# Patient Record
Sex: MALE | Race: BLACK OR AFRICAN AMERICAN | Employment: OTHER | URBAN - METROPOLITAN AREA
[De-identification: names, ages, dates, MRNs, and addresses within clinical notes are randomized per-mention and may not be internally consistent; named-entity substitution may affect disease eponyms.]

---

## 2017-05-13 ENCOUNTER — APPOINTMENT (OUTPATIENT)
Dept: GENERAL RADIOLOGY | Age: 22
End: 2017-05-13
Payer: MEDICAID

## 2017-05-13 ENCOUNTER — HOSPITAL ENCOUNTER (EMERGENCY)
Age: 22
Discharge: HOME | End: 2017-05-13
Attending: STUDENT IN AN ORGANIZED HEALTH CARE EDUCATION/TRAINING PROGRAM
Payer: MEDICAID

## 2017-05-13 VITALS
RESPIRATION RATE: 18 BRPM | SYSTOLIC BLOOD PRESSURE: 109 MMHG | OXYGEN SATURATION: 97 % | HEART RATE: 76 BPM | DIASTOLIC BLOOD PRESSURE: 67 MMHG | TEMPERATURE: 98.8 F

## 2017-05-13 DIAGNOSIS — Z46.59 ENCOUNTER FOR CARE RELATED TO FEEDING TUBE: Primary | ICD-10-CM

## 2017-05-13 PROCEDURE — 6360000004 HC RX CONTRAST MEDICATION: Performed by: STUDENT IN AN ORGANIZED HEALTH CARE EDUCATION/TRAINING PROGRAM

## 2017-05-13 PROCEDURE — 74000 XR ABDOMEN LIMITED (KUB): CPT

## 2017-05-13 PROCEDURE — 99282 EMERGENCY DEPT VISIT SF MDM: CPT

## 2017-05-13 RX ORDER — DIAZEPAM 5 MG/1
2.5 TABLET ORAL EVERY 8 HOURS
COMMUNITY

## 2017-05-13 RX ORDER — AMANTADINE HYDROCHLORIDE 50 MG/5ML
100 SOLUTION ORAL EVERY 12 HOURS
COMMUNITY

## 2017-05-13 RX ORDER — LORATADINE 10 MG/1
10 TABLET ORAL DAILY
COMMUNITY

## 2017-05-13 RX ORDER — LEVETIRACETAM 100 MG/ML
1500 SOLUTION ORAL 2 TIMES DAILY
COMMUNITY

## 2017-05-13 RX ORDER — PREDNISONE 2.5 MG
7.5 TABLET ORAL DAILY
COMMUNITY

## 2017-05-13 RX ORDER — POLYETHYLENE GLYCOL 3350 17 G/17G
17 POWDER, FOR SOLUTION ORAL 2 TIMES DAILY
COMMUNITY

## 2017-05-13 RX ORDER — MONTELUKAST SODIUM 10 MG/1
10 TABLET ORAL NIGHTLY
COMMUNITY

## 2017-05-13 RX ORDER — BACLOFEN 20 MG/1
30 TABLET ORAL 2 TIMES DAILY
COMMUNITY

## 2017-05-13 RX ORDER — IPRATROPIUM BROMIDE AND ALBUTEROL SULFATE 2.5; .5 MG/3ML; MG/3ML
1 SOLUTION RESPIRATORY (INHALATION) EVERY 6 HOURS
COMMUNITY

## 2017-05-13 RX ORDER — TRAMADOL HYDROCHLORIDE 50 MG/1
50 TABLET ORAL EVERY 8 HOURS
COMMUNITY

## 2017-05-13 RX ORDER — ASPIRIN 81 MG/1
81 TABLET, CHEWABLE ORAL DAILY
COMMUNITY

## 2017-05-13 RX ADMIN — DIATRIZOATE MEGLUMINE AND DIATRIZOATE SODIUM 90 ML: 660; 100 LIQUID ORAL; RECTAL at 14:20

## 2017-05-13 ASSESSMENT — ENCOUNTER SYMPTOMS: VOMITING: 0

## 2017-09-29 ENCOUNTER — APPOINTMENT (OUTPATIENT)
Dept: GENERAL RADIOLOGY | Age: 22
DRG: 720 | End: 2017-09-29
Payer: MEDICAID

## 2017-09-29 ENCOUNTER — HOSPITAL ENCOUNTER (INPATIENT)
Age: 22
LOS: 3 days | Discharge: NURSING, REHAB OR LONG-TERM CARE | DRG: 720 | End: 2017-10-02
Attending: EMERGENCY MEDICINE | Admitting: HOSPITALIST
Payer: MEDICAID

## 2017-09-29 DIAGNOSIS — J96.01 ACUTE RESPIRATORY FAILURE WITH HYPOXIA (HCC): ICD-10-CM

## 2017-09-29 DIAGNOSIS — J04.11 ACUTE TRACHEITIS WITH AIRWAY OBSTRUCTION: ICD-10-CM

## 2017-09-29 DIAGNOSIS — J45.52 SEVERE PERSISTENT ASTHMA WITH STATUS ASTHMATICUS: Primary | ICD-10-CM

## 2017-09-29 PROBLEM — A41.9 SEPSIS (HCC): Status: ACTIVE | Noted: 2017-09-29

## 2017-09-29 LAB
ALBUMIN SERPL-MCNC: 4.4 G/DL (ref 3.9–4.9)
ALP BLD-CCNC: 92 U/L (ref 35–104)
ALT SERPL-CCNC: 73 U/L (ref 0–41)
ANION GAP SERPL CALCULATED.3IONS-SCNC: 16 MEQ/L (ref 7–13)
AST SERPL-CCNC: 26 U/L (ref 0–40)
BASE EXCESS ARTERIAL: 4 (ref -3–3)
BASE EXCESS ARTERIAL: 4 (ref -3–3)
BASOPHILS ABSOLUTE: 0 K/UL (ref 0–0.2)
BASOPHILS RELATIVE PERCENT: 0.1 %
BILIRUB SERPL-MCNC: 0.7 MG/DL (ref 0–1.2)
BUN BLDV-MCNC: 14 MG/DL (ref 6–20)
CALCIUM SERPL-MCNC: 9.7 MG/DL (ref 8.6–10.2)
CHLORIDE BLD-SCNC: 104 MEQ/L (ref 98–107)
CO2: 28 MEQ/L (ref 22–29)
CREAT SERPL-MCNC: 0.69 MG/DL (ref 0.7–1.2)
EOSINOPHILS ABSOLUTE: 0 K/UL (ref 0–0.7)
EOSINOPHILS RELATIVE PERCENT: 0 %
GFR AFRICAN AMERICAN: >60
GFR NON-AFRICAN AMERICAN: >60
GLOBULIN: 3.4 G/DL (ref 2.3–3.5)
GLUCOSE BLD-MCNC: 112 MG/DL (ref 74–109)
HCO3 ARTERIAL: 29.4 MMOL/L (ref 21–29)
HCO3 ARTERIAL: 30.4 MMOL/L (ref 21–29)
HCT VFR BLD CALC: 55.9 % (ref 42–52)
HEMOGLOBIN: 17.6 G/DL (ref 14–18)
LACTATE: 1.52 MMOL/L (ref 0.4–2)
LACTATE: 2.65 MMOL/L (ref 0.4–2)
LACTIC ACID: 2.6 MMOL/L (ref 0.5–2.2)
LYMPHOCYTES ABSOLUTE: 0.5 K/UL (ref 1–4.8)
LYMPHOCYTES RELATIVE PERCENT: 3.1 %
MAGNESIUM: 2.3 MG/DL (ref 1.7–2.3)
MCH RBC QN AUTO: 27.3 PG (ref 27–31.3)
MCHC RBC AUTO-ENTMCNC: 31.4 % (ref 33–37)
MCV RBC AUTO: 87 FL (ref 80–100)
MONOCYTES ABSOLUTE: 0.5 K/UL (ref 0.2–0.8)
MONOCYTES RELATIVE PERCENT: 3.1 %
NEUTROPHILS ABSOLUTE: 15.2 K/UL (ref 1.4–6.5)
NEUTROPHILS RELATIVE PERCENT: 93.7 %
O2 SAT, ARTERIAL: 100 % (ref 93–100)
O2 SAT, ARTERIAL: 89 % (ref 93–100)
PCO2 ARTERIAL: 56 MM HG (ref 35–45)
PCO2 ARTERIAL: 62 MM HG (ref 35–45)
PDW BLD-RTO: 12.9 % (ref 11.5–14.5)
PERFORMED ON: ABNORMAL
PERFORMED ON: ABNORMAL
PH ARTERIAL: 7.3 (ref 7.35–7.45)
PH ARTERIAL: 7.33 (ref 7.35–7.45)
PLATELET # BLD: 227 K/UL (ref 130–400)
PO2 ARTERIAL: 578 MM HG (ref 75–108)
PO2 ARTERIAL: 61 MM HG (ref 75–108)
POC FIO2: 100
POC SAMPLE TYPE: ABNORMAL
POC SAMPLE TYPE: ABNORMAL
POTASSIUM SERPL-SCNC: 4.4 MEQ/L (ref 3.5–5.1)
RBC # BLD: 6.42 M/UL (ref 4.7–6.1)
SODIUM BLD-SCNC: 148 MEQ/L (ref 132–144)
TCO2 ARTERIAL: 31 (ref 22–29)
TCO2 ARTERIAL: 32 (ref 22–29)
TOTAL PROTEIN: 7.8 G/DL (ref 6.4–8.1)
TROPONIN: <0.01 NG/ML (ref 0–0.01)
WBC # BLD: 16.2 K/UL (ref 4.8–10.8)

## 2017-09-29 PROCEDURE — 5A1945Z RESPIRATORY VENTILATION, 24-96 CONSECUTIVE HOURS: ICD-10-PCS | Performed by: INTERNAL MEDICINE

## 2017-09-29 PROCEDURE — 6360000002 HC RX W HCPCS: Performed by: EMERGENCY MEDICINE

## 2017-09-29 PROCEDURE — 6360000002 HC RX W HCPCS: Performed by: INTERNAL MEDICINE

## 2017-09-29 PROCEDURE — 6360000002 HC RX W HCPCS: Performed by: HOSPITALIST

## 2017-09-29 PROCEDURE — 80053 COMPREHEN METABOLIC PANEL: CPT

## 2017-09-29 PROCEDURE — 94640 AIRWAY INHALATION TREATMENT: CPT

## 2017-09-29 PROCEDURE — 85025 COMPLETE CBC W/AUTO DIFF WBC: CPT

## 2017-09-29 PROCEDURE — 99285 EMERGENCY DEPT VISIT HI MDM: CPT

## 2017-09-29 PROCEDURE — 84484 ASSAY OF TROPONIN QUANT: CPT

## 2017-09-29 PROCEDURE — 36600 WITHDRAWAL OF ARTERIAL BLOOD: CPT

## 2017-09-29 PROCEDURE — 2580000003 HC RX 258: Performed by: EMERGENCY MEDICINE

## 2017-09-29 PROCEDURE — 6370000000 HC RX 637 (ALT 250 FOR IP): Performed by: HOSPITALIST

## 2017-09-29 PROCEDURE — 94002 VENT MGMT INPAT INIT DAY: CPT

## 2017-09-29 PROCEDURE — 82803 BLOOD GASES ANY COMBINATION: CPT

## 2017-09-29 PROCEDURE — 2700000000 HC OXYGEN THERAPY PER DAY

## 2017-09-29 PROCEDURE — 87040 BLOOD CULTURE FOR BACTERIA: CPT

## 2017-09-29 PROCEDURE — 36415 COLL VENOUS BLD VENIPUNCTURE: CPT

## 2017-09-29 PROCEDURE — 71010 XR CHEST PORTABLE: CPT

## 2017-09-29 PROCEDURE — 87070 CULTURE OTHR SPECIMN AEROBIC: CPT

## 2017-09-29 PROCEDURE — 2000000000 HC ICU R&B

## 2017-09-29 PROCEDURE — 2580000003 HC RX 258: Performed by: HOSPITALIST

## 2017-09-29 PROCEDURE — 96365 THER/PROPH/DIAG IV INF INIT: CPT

## 2017-09-29 PROCEDURE — 87205 SMEAR GRAM STAIN: CPT

## 2017-09-29 PROCEDURE — 96375 TX/PRO/DX INJ NEW DRUG ADDON: CPT

## 2017-09-29 PROCEDURE — 96367 TX/PROPH/DG ADDL SEQ IV INF: CPT

## 2017-09-29 PROCEDURE — 6370000000 HC RX 637 (ALT 250 FOR IP): Performed by: EMERGENCY MEDICINE

## 2017-09-29 PROCEDURE — 83735 ASSAY OF MAGNESIUM: CPT

## 2017-09-29 PROCEDURE — 83605 ASSAY OF LACTIC ACID: CPT

## 2017-09-29 PROCEDURE — 2580000003 HC RX 258: Performed by: INTERNAL MEDICINE

## 2017-09-29 RX ORDER — IPRATROPIUM BROMIDE AND ALBUTEROL SULFATE 2.5; .5 MG/3ML; MG/3ML
1 SOLUTION RESPIRATORY (INHALATION) EVERY 6 HOURS
Status: DISCONTINUED | OUTPATIENT
Start: 2017-09-29 | End: 2017-09-29 | Stop reason: SDUPTHER

## 2017-09-29 RX ORDER — SODIUM CHLORIDE 0.9 % (FLUSH) 0.9 %
10 SYRINGE (ML) INJECTION PRN
Status: DISCONTINUED | OUTPATIENT
Start: 2017-09-29 | End: 2017-10-02 | Stop reason: HOSPADM

## 2017-09-29 RX ORDER — LEVETIRACETAM 100 MG/ML
1500 SOLUTION ORAL 2 TIMES DAILY
Status: DISCONTINUED | OUTPATIENT
Start: 2017-09-29 | End: 2017-10-02 | Stop reason: HOSPADM

## 2017-09-29 RX ORDER — IPRATROPIUM BROMIDE AND ALBUTEROL SULFATE 2.5; .5 MG/3ML; MG/3ML
1 SOLUTION RESPIRATORY (INHALATION)
Status: DISCONTINUED | OUTPATIENT
Start: 2017-09-29 | End: 2017-09-29

## 2017-09-29 RX ORDER — ONDANSETRON 2 MG/ML
4 INJECTION INTRAMUSCULAR; INTRAVENOUS EVERY 6 HOURS PRN
Status: DISCONTINUED | OUTPATIENT
Start: 2017-09-29 | End: 2017-10-02 | Stop reason: HOSPADM

## 2017-09-29 RX ORDER — TRAMADOL HYDROCHLORIDE 50 MG/1
50 TABLET ORAL EVERY 8 HOURS
Status: DISCONTINUED | OUTPATIENT
Start: 2017-09-29 | End: 2017-10-02 | Stop reason: HOSPADM

## 2017-09-29 RX ORDER — PREDNISONE 1 MG/1
7.5 TABLET ORAL DAILY
Status: DISCONTINUED | OUTPATIENT
Start: 2017-09-29 | End: 2017-10-02 | Stop reason: HOSPADM

## 2017-09-29 RX ORDER — 0.9 % SODIUM CHLORIDE 0.9 %
1000 INTRAVENOUS SOLUTION INTRAVENOUS ONCE
Status: COMPLETED | OUTPATIENT
Start: 2017-09-29 | End: 2017-09-29

## 2017-09-29 RX ORDER — DOCUSATE SODIUM 100 MG/1
100 CAPSULE, LIQUID FILLED ORAL 2 TIMES DAILY
Status: DISCONTINUED | OUTPATIENT
Start: 2017-09-29 | End: 2017-10-02 | Stop reason: HOSPADM

## 2017-09-29 RX ORDER — LORAZEPAM 2 MG/ML
1 INJECTION INTRAMUSCULAR ONCE
Status: COMPLETED | OUTPATIENT
Start: 2017-09-29 | End: 2017-09-29

## 2017-09-29 RX ORDER — DIAZEPAM 5 MG/1
2.5 TABLET ORAL EVERY 8 HOURS
Status: DISCONTINUED | OUTPATIENT
Start: 2017-09-29 | End: 2017-10-02 | Stop reason: HOSPADM

## 2017-09-29 RX ORDER — ALBUTEROL SULFATE 90 UG/1
4 AEROSOL, METERED RESPIRATORY (INHALATION)
Status: DISCONTINUED | OUTPATIENT
Start: 2017-09-30 | End: 2017-10-02 | Stop reason: HOSPADM

## 2017-09-29 RX ORDER — METHYLPREDNISOLONE SODIUM SUCCINATE 125 MG/2ML
125 INJECTION, POWDER, LYOPHILIZED, FOR SOLUTION INTRAMUSCULAR; INTRAVENOUS ONCE
Status: COMPLETED | OUTPATIENT
Start: 2017-09-29 | End: 2017-09-29

## 2017-09-29 RX ORDER — BACLOFEN 10 MG/1
30 TABLET ORAL 2 TIMES DAILY
Status: DISCONTINUED | OUTPATIENT
Start: 2017-09-29 | End: 2017-10-02 | Stop reason: HOSPADM

## 2017-09-29 RX ORDER — AMANTADINE HYDROCHLORIDE 50 MG/5ML
100 SOLUTION ORAL EVERY 12 HOURS
Status: DISCONTINUED | OUTPATIENT
Start: 2017-09-29 | End: 2017-10-02 | Stop reason: HOSPADM

## 2017-09-29 RX ORDER — KETOROLAC TROMETHAMINE 30 MG/ML
30 INJECTION, SOLUTION INTRAMUSCULAR; INTRAVENOUS ONCE
Status: COMPLETED | OUTPATIENT
Start: 2017-09-29 | End: 2017-09-29

## 2017-09-29 RX ORDER — ALBUTEROL SULFATE 2.5 MG/3ML
2.5 SOLUTION RESPIRATORY (INHALATION)
Status: DISPENSED | OUTPATIENT
Start: 2017-09-29 | End: 2017-09-29

## 2017-09-29 RX ORDER — ACETAMINOPHEN 325 MG/1
650 TABLET ORAL ONCE
Status: COMPLETED | OUTPATIENT
Start: 2017-09-29 | End: 2017-09-29

## 2017-09-29 RX ORDER — ASPIRIN 81 MG/1
81 TABLET, CHEWABLE ORAL DAILY
Status: DISCONTINUED | OUTPATIENT
Start: 2017-09-29 | End: 2017-10-02 | Stop reason: HOSPADM

## 2017-09-29 RX ORDER — CETIRIZINE HYDROCHLORIDE 10 MG/1
10 TABLET ORAL DAILY
Status: DISCONTINUED | OUTPATIENT
Start: 2017-09-29 | End: 2017-10-02 | Stop reason: HOSPADM

## 2017-09-29 RX ORDER — SODIUM CHLORIDE 9 MG/ML
INJECTION, SOLUTION INTRAVENOUS CONTINUOUS
Status: DISCONTINUED | OUTPATIENT
Start: 2017-09-29 | End: 2017-10-02

## 2017-09-29 RX ORDER — SODIUM CHLORIDE 0.9 % (FLUSH) 0.9 %
10 SYRINGE (ML) INJECTION EVERY 12 HOURS SCHEDULED
Status: DISCONTINUED | OUTPATIENT
Start: 2017-09-29 | End: 2017-10-02 | Stop reason: HOSPADM

## 2017-09-29 RX ORDER — ACETAMINOPHEN 325 MG/1
650 TABLET ORAL EVERY 4 HOURS PRN
Status: DISCONTINUED | OUTPATIENT
Start: 2017-09-29 | End: 2017-10-02 | Stop reason: HOSPADM

## 2017-09-29 RX ORDER — ALBUTEROL SULFATE 90 UG/1
4 AEROSOL, METERED RESPIRATORY (INHALATION) EVERY 4 HOURS
Status: DISCONTINUED | OUTPATIENT
Start: 2017-09-29 | End: 2017-09-29

## 2017-09-29 RX ORDER — MONTELUKAST SODIUM 10 MG/1
10 TABLET ORAL NIGHTLY
Status: DISCONTINUED | OUTPATIENT
Start: 2017-09-29 | End: 2017-10-02 | Stop reason: HOSPADM

## 2017-09-29 RX ADMIN — SODIUM CHLORIDE 1000 ML: 9 INJECTION, SOLUTION INTRAVENOUS at 21:02

## 2017-09-29 RX ADMIN — SODIUM CHLORIDE: 9 INJECTION, SOLUTION INTRAVENOUS at 23:59

## 2017-09-29 RX ADMIN — METHYLPREDNISOLONE SODIUM SUCCINATE 125 MG: 125 INJECTION, POWDER, FOR SOLUTION INTRAMUSCULAR; INTRAVENOUS at 12:55

## 2017-09-29 RX ADMIN — PIPERACILLIN SODIUM AND TAZOBACTAM SODIUM 3.38 G: 3; .375 INJECTION, POWDER, LYOPHILIZED, FOR SOLUTION INTRAVENOUS at 21:32

## 2017-09-29 RX ADMIN — SODIUM CHLORIDE 1000 ML: 9 INJECTION, SOLUTION INTRAVENOUS at 12:55

## 2017-09-29 RX ADMIN — ALBUTEROL SULFATE 2.5 MG: 2.5 SOLUTION RESPIRATORY (INHALATION) at 13:00

## 2017-09-29 RX ADMIN — VANCOMYCIN HYDROCHLORIDE 1000 MG: 1 INJECTION, POWDER, LYOPHILIZED, FOR SOLUTION INTRAVENOUS at 15:40

## 2017-09-29 RX ADMIN — IPRATROPIUM BROMIDE 4 PUFF: 17 AEROSOL, METERED RESPIRATORY (INHALATION) at 21:47

## 2017-09-29 RX ADMIN — ENOXAPARIN SODIUM 40 MG: 40 INJECTION SUBCUTANEOUS at 21:09

## 2017-09-29 RX ADMIN — TRAMADOL HYDROCHLORIDE 50 MG: 50 TABLET, FILM COATED ORAL at 21:06

## 2017-09-29 RX ADMIN — LEVETIRACETAM 1500 MG: 100 SOLUTION ORAL at 21:31

## 2017-09-29 RX ADMIN — PIPERACILLIN SODIUM AND TAZOBACTAM SODIUM 3.38 G: 3; .375 INJECTION, POWDER, LYOPHILIZED, FOR SOLUTION INTRAVENOUS at 14:06

## 2017-09-29 RX ADMIN — LORAZEPAM 1 MG: 2 INJECTION INTRAMUSCULAR; INTRAVENOUS at 17:48

## 2017-09-29 RX ADMIN — SODIUM CHLORIDE, PRESERVATIVE FREE 10 ML: 5 INJECTION INTRAVENOUS at 21:40

## 2017-09-29 RX ADMIN — DIAZEPAM 2.5 MG: 5 TABLET ORAL at 21:32

## 2017-09-29 RX ADMIN — ALBUTEROL SULFATE 2.5 MG: 2.5 SOLUTION RESPIRATORY (INHALATION) at 12:59

## 2017-09-29 RX ADMIN — ACETAMINOPHEN 650 MG: 325 TABLET ORAL at 12:55

## 2017-09-29 RX ADMIN — KETOROLAC TROMETHAMINE 30 MG: 30 INJECTION, SOLUTION INTRAMUSCULAR at 12:55

## 2017-09-29 RX ADMIN — Medication 4 PUFF: at 21:47

## 2017-09-29 RX ADMIN — BACLOFEN 30 MG: 10 TABLET ORAL at 21:06

## 2017-09-29 RX ADMIN — MONTELUKAST SODIUM 10 MG: 10 TABLET, FILM COATED ORAL at 21:06

## 2017-09-29 RX ADMIN — AMANTADINE HYDROCHLORIDE 100 MG: 50 SOLUTION ORAL at 23:59

## 2017-09-29 RX ADMIN — SODIUM CHLORIDE: 9 INJECTION, SOLUTION INTRAVENOUS at 18:30

## 2017-09-29 ASSESSMENT — PULMONARY FUNCTION TESTS
PIF_VALUE: 72
PIF_VALUE: 60
PIF_VALUE: 46
PIF_VALUE: 62
PIF_VALUE: 59
PIF_VALUE: 68
PIF_VALUE: 71

## 2017-09-29 ASSESSMENT — PAIN SCALES - GENERAL
PAINLEVEL_OUTOF10: 0

## 2017-09-29 NOTE — IP AVS SNAPSHOT
Your input is valuable to us. We encourage you to complete and return your survey in the envelope provided. We hope you will choose us in the future for your healthcare needs. Patient Information     Patient Name Lucinda Lacey 1995      Care Provided at:     Name Address Phone       [address] [id number] [phone]            Your Visit    Here you will find information about your visit, including the reason for your visit. Please take this sheet with you when you visit your doctor or other health care provider in the future. It will help determine the best possible medical care for you at that time. If you have any questions once you leave the hospital, please call the department phone number listed below. Why you were here     Your primary diagnosis was:  Acute Respiratory Failure With Hypoxia (Hcc)    Your diagnoses also included:  Sepsis (Hcc), Severe Persistent Asthma With Status Asthmaticus, Bronchitis, Pneumonia Due To Infectious Organism, [place] Coma Scale Total Score 3-8 (Hcc)      Visit Information     Date & Time Provider Department Dept. Phone    9/29/2017 Olga Horton MD [facility] [phone]       Follow-up Appointments    Below is a list of your follow-up and future appointments. This may not be a complete list as you may have made appointments directly with providers that we are not aware of or your providers may have made some for you. Please call your providers to confirm appointments. It is important to keep your appointments. Please bring your current insurance card, photo ID, co-pay, and all medication bottles to your appointment. If self-pay, payment is expected at the time of service.         Follow-up Information     Follow up with Kaden Yun MD.    Specialty:  Internal Medicine    Contact information:    [address]  [address] [place] [address] [facility]  [phone] Follow up with Isaac Covington MD In 2 days. Specialty:  Internal Medicine    Contact information:    [address] [facility] [address]  [phone]        Preventive Care        Date Due    HIV screening is recommended for all people regardless of risk factors  aged 15-65 years at least once (lifetime) who have never been HIV tested. 2008    Tetanus Combination Vaccine (1 - Tdap) 2012    Pneumococcal Vaccine - Pneumovax for adults aged 19-64 years with: chronic heart disease, chronic lung disease, diabetes mellitus, alcoholism, chronic liver disease, or cigarette smoking. (1 of 1 - PPSV23) 2012    Yearly Flu Vaccine (1) 2017                 Care Plan Once You Return Home    This section includes instructions you will need to follow once you leave the hospital.  Your care team will discuss these with you, so you and those caring for you know how to best care for your health needs at home. This section may also include educational information about certain health topics that may be of help to you. Discharge [facility] [address] [place] [address] Form    Patient Name: Jaquelin Richardson   :  1995  MRN:  [id number]    Admit date:  2017  Discharge date:  10/2/2017    Code Status Order: Full Code   Advance Directives: No    Admitting Physician:  Carly Ramirez MD  PCP: Isaac Covington MD    Discharging Nurse: be  [address] Unit/Room#: IC14/IC14-01  Discharging Unit Phone Number: [phone]    Emergency Contact:   Contact 1: Name: Srini DAVIS  Contact 1: Number: [phone]              [phone]  Contact 1: Relationship: mother    Past Surgical History:  Past Surgical History:   Procedure Laterality Date    GASTROSTOMY TUBE PLACEMENT      TRACHEOSTOMY         Immunization History: There is no immunization history on file for this patient.     Active Problems:  Patient Active Problem List   Diagnosis    Sepsis ([facility] [address]  Acute respiratory failure with hypoxia (HCC)    Severe persistent asthma with status asthmaticus    Bronchitis       Isolation/Infection:   Isolation     No Isolation            Nurse Assessment:  Last Vital Signs: /82  Pulse 72  Temp 99.8 °F (37.7 °C) (Temporal)   Resp 14  Ht 5' 10\" (1.778 m)  Wt 210 lb 5.1 oz (95.4 kg)  SpO2 100%  BMI 30.18 kg/m2    Last documented pain score (0-10 scale): Pain Level: 2  Last Weight:   Wt Readings from Last 1 Encounters:   10/02/17 210 lb 5.1 oz (95.4 kg)     Mental Status:  disoriented and coherent     IV Access:  - Central Venous Catheter  - site  right, insertion date: 9/29/2017    Nursing Mobility/ADLs:  Walking   Dependent  Transfer  Dependent  Bathing  Dependent  Dressing  Assisted  Toileting  Dependent  Feeding  Dependent  Med Admin  Dependent  Med Delivery   crushed    Wound Care Documentation and Therapy:        Elimination:  Continence:   · Bowel: No  · Bladder: No  Urinary Catheter: None   Colostomy/Ileostomy/Ileal Conduit: No    Date of Last BM: 10/2/2017    Intake/Output Summary (Last 24 hours) at 10/02/17 1209  Last data filed at 10/02/17 0625   Gross per 24 hour   Intake             1608 ml   Output             1625 ml   Net              -17 ml     I/O last 3 completed shifts: In: 8522 [I.V.:1408; NG/GT:200]  Out: 1625 [Urine:1625]    Safety Concerns:      At Risk for Falls, History of Seizures and Aspiration Risk    Impairments/Disabilities:      None    Nutrition Therapy:  Current Nutrition Therapy:   - Tube Feedings:  jeviy 1.2    Routes of Feeding: Gastrostomy Tube  Liquids: No Liquids  Daily Fluid Restriction: no  Last Modified Barium Swallow with Video (Video Swallowing Test): not done    Treatments at the Time of Hospital Discharge:   Respiratory Treatments:     Oxygen Therapy:  vent  Ventilator:    - Ventilator Settings:    Vt Ordered: 500 mL  Rate Set: 8 bmp  FiO2 : 35 %    PEEP/CPAP: 5  Pressure Support: 5 cmH20    Rehab Therapies: *** Weight Bearing Status/Restrictions: Non-weight bearing on left leg  Other Medical Equipment (for information only, NOT a DME order):  hospital bed  Other Treatments:       Patient's personal belongings (please select all that are sent with patient):  None    RN SIGNATURE:  Electronically signed by Bret Goldman RN on 10/2/17 at 3:46 PM    PHYSICIAN SECTION    Prognosis: Fair    Condition at Discharge: Stable    Rehab Potential (if transferring to Rehab): [address]    Physician Certification: I certify the above information and transfer of Lisandro Barbosa  is necessary for the continuing treatment of the diagnosis listed and that he requires LTAC for less 30 days. Update Admission H&P: No change in H&P    PHYSICIAN SIGNATURE:  Electronically signed by Lenny Last MD on 10/2/17 at 3:58 PM    CASE MANAGEMENT/SOCIAL WORK SECTION    Inpatient Status Date: ***    [facility] Readmission Risk Assessment Score:  Risk Score: 24   (Score > 14= high risk for readmission)    Discharging to Facility/ Agency   · Name: [facility]  · Address: [address] [place] [address]  · Phone: [phone]  · Fax:    Dialysis Facility (if applicable)   · Name:  · Address:  · Dialysis Schedule:  · Phone:  · Fax:    / signature: Electronically signed by Dyan Snider RN on 10/2/17 at 2:11 PM      Diet Instructions     ? Good nutrition is important when healing from an illness, injury, or surgery. Follow any nutrition recommendations given to you during your hospital stay. ? If you were given an oral nutrition supplement while in the hospital, continue to take this supplement at home. You can take it with meals, in-between meals, and/or before bedtime. These supplements can be purchased at most local grocery stores, pharmacies, and chain [employer]-stores. ? If you have any questions about your diet or nutrition, call the hospital and ask for the dietitian. Jevity1.2 @ 25 cc/hr when TF available           Activity Instructions     Bed rest           Important information for a smoker       SMOKING: QUIT SMOKING. THIS IS THE MOST IMPORTANT ACTION YOU CAN TAKE TO IMPROVE YOUR CURRENT AND FUTURE HEALTH. Call the [facility] NOW ([phone])    Smoking harms nonsmokers. When nonsmokers are around people who smoke, they absorb nicotine, carbon monoxide, and other ingredients of tobacco smoke. DO NOT SMOKE AROUND CHILDREN     Children exposed to secondhand smoke are at an increased risk of:  Sudden Infant Death Syndrome (SIDS), acute respiratory infections, inflammation of the middle ear, and severe asthma. Over a longer time, it causes heart disease and lung cancer. There is no safe level of exposure to secondhand smoke. MyChart Signup     [employer] allows you to send messages to your doctor, view your test results, renew your prescriptions, schedule appointments, view visit notes, and more. How Do I Sign Up? 1. In your Internet browser, go to [email or web address]  2. Click on the Sign Up Now link in the Sign In box. You will see the New Member Sign Up page. 3. Enter your [employer] Access Code exactly as it appears below. You will not need to use this code after youve completed the sign-up process. If you do not sign up before the expiration date, you must request a new code. [employer] Access Code: [email or web address]  Expires: 12/1/2017  2:39 PM    4. Enter your Social Security Number ([id number]) and Date of Birth (mm/dd/yyyy) as indicated and click Submit. You will be taken to the next sign-up page. 5. Create a [employer] ID. This will be your [employer] login ID and cannot be changed, so think of one that is secure and easy to remember. 6. Create a [employer] password. You can change your password at any time. 7. Enter your Password Reset Question and Answer.  This can be used at a

## 2017-09-29 NOTE — ED PROVIDER NOTES
[address] [facility] ED  eMERGENCY dEPARTMENT eNCOUnter      Pt Name: Alfonso Anthony  MRN: [id number]  [place] 1995  Date of evaluation: 9/29/2017  Provider: Bree Ortiz MD    CHIEF COMPLAINT           HPI  Alfonso Anthony is a 19 y.o. male per chart review has a h/o anoxic brain injury from asthma attack at age 14, persistent vegetative state s/p trach/peg, asthma, seizures on keppra presents to the ED with sob. Pt lives in a NH and does not speak or respond to stimuli. Per NH, pt has had a RR in the 30s and hypoxia to the 80s since this am.  Pt also noted to have large amounts of purulence coming from his trach site. ROS  Review of Systems   Unable to perform ROS: Acuity of condition       Except as noted above the remainder of the review of systems was reviewed and negative. PAST MEDICAL HISTORY     Past Medical History:   Diagnosis Date    Anoxic brain injury ([facility] [address]     from asthma attack at age 14    Asthma     Gastrocutaneous fistula     Persistent vegetative state ([facility] [address]     Renal calculi     Respiratory failure ([facility] [address]     Seizures (HCC)          SURGICAL HISTORY       Past Surgical History:   Procedure Laterality Date    GASTROSTOMY TUBE PLACEMENT      TRACHEOSTOMY           CURRENT MEDICATIONS       Previous Medications    AMANTADINE (SYMMETREL) 50 MG/5ML SOLUTION    100 mg by Per G Tube route every 12 hours    ASPIRIN 81 MG CHEWABLE TABLET    81 mg by Per G Tube route daily    BACLOFEN (LIORESAL) 20 MG TABLET    30 mg by Per G Tube route 2 times daily    DIAZEPAM (VALIUM) 5 MG TABLET    2.5 mg by Per G Tube route every 8 hours    ERGOCALCIFEROL (VITAMIN D2 PO)    50,000 Units by Per G Tube route once a week Indications:  On Mondays    HYOSCYAMINE (LEVSIN/SL) 125 MCG SUBLINGUAL TABLET    Place 125 mcg under the tongue every 8 hours    IPRATROPIUM-ALBUTEROL (DUONEB) 0.5-2.5 (3) MG/3ML SOLN NEBULIZER SOLUTION    Inhale 1 vial into the lungs every 6 hours    LEVETIRACETAM (KEPPRA) 100 MG/ML SOLUTION    1,500 mg by Per G Tube route 2 times daily    LORATADINE (CLARITIN) 10 MG TABLET    10 mg by Per G Tube route daily    MONTELUKAST (SINGULAIR) 10 MG TABLET    10 mg by Per G Tube route nightly    POLYETHYLENE GLYCOL (GLYCOLAX) POWDER    17 g by Per G Tube route 2 times daily    PREDNISONE (DELTASONE) 2.5 MG TABLET    7.5 mg by Per G Tube route daily    TRAMADOL (ULTRAM) 50 MG TABLET    50 mg by Per G Tube route every 8 hours Indications: also 3 times a day prn       ALLERGIES     Latex; Corn-containing products; Eggs or egg-derived products; Fish-derived products; Iodinated diagnostic agents; Peanut-containing drug products; Shellfish-derived products; and Strawberry extract    FAMILY HISTORY     History reviewed. No pertinent family history. SOCIAL HISTORY       Social History     Social History    Marital status: Single     Spouse name: N/A    Number of children: N/A    Years of education: N/A     Social History Main Topics    Smoking status: Unknown If Ever Smoked    Smokeless tobacco: None    Alcohol use No    Drug use: No    Sexual activity: Not Asked     Other Topics Concern    None     Social History Narrative         PHYSICAL EXAM     ED Triage Vitals   BP Temp Temp src Pulse Resp SpO2 Height Weight   -- -- -- -- -- -- -- --                 Physical Exam   Constitutional:   Laying flat in bed, minimally responsive   HENT:   Head: Normocephalic. Right Ear: External ear normal.   Left Ear: External ear normal.   Oropharynx dry   Eyes: Conjunctivae are normal. Pupils are equal, round, and reactive to light. Neck: Normal range of motion. Neck supple. Trach noted. Large amount of yellow purulence noted from trach site   Cardiovascular:   Tachycardic, no rubs, murmurs, clicks, gallops   Pulmonary/Chest:   Moderate air mvmt with diffuse rhonchi   Abdominal:   Soft, nondistended.   PEG noted   Musculoskeletal:   No cyanosis, clubbing   Neurological:   Non verbal.  Does not respond to pain   Skin: Skin is warm and dry. Psychiatric:   Unable to assess   Nursing note and vitals reviewed. MDM  20 yo male presents to the ED with sob. Pt noted to be tachycardic to the 140s and 84% on RA. Pt with temp 101. Pt given IV toradol, tylenol per PEG tube. Pt placed on 50% trach collar with improvement of sats to 92%. Labs significant for WBC 16, Na 148, glucose 112, lactic acid 2.6. CXR negative. Pt's trach suctioned multiple times with copious amounts of yellow/greem sputum. Given normal lungs, fever, pt likely with tracheitis and asthma exacerbation. Pt given IV solumedrol, albuterol nebs x 3. Due to fever, tracheitis, blood cultures drawn and pt given IV vanco, IV zosyn. Pt without reliable IV access in the ED so a R IJ CVC was placed. CXR confirmed placement. Consent was obtained over the phone with the father. Pt while in the ED began to get more tachycardic and sats dropped to 85% while on the trach collar. ABG was obtained which shows pH 7.33, pO2 60, sats 88%. Pt was then placed on the vent with improvement of sats to 100%. Due to respiratory failure, tracheitis, fever, status asthmaticus, case discussed with Dr. Padmini Mclaughlin and pt admitted to ICU for further management. Family aware. Critical Care time:  92 min excluding procedures          FINAL IMPRESSION      1. Severe persistent asthma with status asthmaticus    2. Acute tracheitis with airway obstruction    3.  Acute respiratory failure with hypoxia ([facility] [address]          DISPOSITION/PLAN   DISPOSITION Decision to Admit      DISCHARGE MEDICATIONS:  New Prescriptions    No medications on file            Tremayne Cruz MD (electronically signed)  Attending Emergency Physician           Tremayne Cruz MD  09/29/17 1309

## 2017-09-29 NOTE — PROGRESS NOTES
Patient had a cuffless 7. 0XLT trach in. Due to leak while on the vent, patient changed to a cuffed 7. 0XLT. Dr Bernice Martel at bedside. Patient hyperinflated and hyperoxygenated prior to procedure. SpO2 98% before procedure. Trach removed and inserted over guide catheter without incident by Dr. Shaquille Xavier. Bilateral and equal breath sounds confirmed along with condensation in the tubing and capnography positive color change.

## 2017-09-29 NOTE — ED NOTES
Bed: 22  Expected date: 9/29/17  Expected time:   Means of arrival:   Comments:  Pt from [facility]  resp distress earlier, + trach,g-tube temporarily 99-94/-86%     Racheal Muñoz, COLT  09/29/17 8129

## 2017-09-30 ENCOUNTER — APPOINTMENT (OUTPATIENT)
Dept: GENERAL RADIOLOGY | Age: 22
DRG: 720 | End: 2017-09-30
Payer: MEDICAID

## 2017-09-30 LAB
ANION GAP SERPL CALCULATED.3IONS-SCNC: 13 MEQ/L (ref 7–13)
BASE EXCESS ARTERIAL: 2 (ref -3–3)
BASOPHILS ABSOLUTE: 0 K/UL (ref 0–0.2)
BASOPHILS RELATIVE PERCENT: 0.1 %
BUN BLDV-MCNC: 15 MG/DL (ref 6–20)
C-REACTIVE PROTEIN, HIGH SENSITIVITY: 58.5 MG/L (ref 0–5)
CALCIUM SERPL-MCNC: 8.2 MG/DL (ref 8.6–10.2)
CHLORIDE BLD-SCNC: 108 MEQ/L (ref 98–107)
CO2: 25 MEQ/L (ref 22–29)
CREAT SERPL-MCNC: 0.91 MG/DL (ref 0.7–1.2)
EOSINOPHILS ABSOLUTE: 0 K/UL (ref 0–0.7)
EOSINOPHILS RELATIVE PERCENT: 0 %
GFR AFRICAN AMERICAN: >60
GFR NON-AFRICAN AMERICAN: >60
GLUCOSE BLD-MCNC: 90 MG/DL (ref 74–109)
HCO3 ARTERIAL: 27.2 MMOL/L (ref 21–29)
HCT VFR BLD CALC: 46.5 % (ref 42–52)
HEMOGLOBIN: 14.6 G/DL (ref 14–18)
LACTATE: 1.4 MMOL/L (ref 0.4–2)
LYMPHOCYTES ABSOLUTE: 0.6 K/UL (ref 1–4.8)
LYMPHOCYTES RELATIVE PERCENT: 3.3 %
MAGNESIUM: 2.2 MG/DL (ref 1.7–2.3)
MCH RBC QN AUTO: 27.5 PG (ref 27–31.3)
MCHC RBC AUTO-ENTMCNC: 31.5 % (ref 33–37)
MCV RBC AUTO: 87.4 FL (ref 80–100)
MONOCYTES ABSOLUTE: 1.1 K/UL (ref 0.2–0.8)
MONOCYTES RELATIVE PERCENT: 5.9 %
NEUTROPHILS ABSOLUTE: 17.3 K/UL (ref 1.4–6.5)
NEUTROPHILS RELATIVE PERCENT: 90.7 %
O2 SAT, ARTERIAL: 97 % (ref 93–100)
PCO2 ARTERIAL: 50 MM HG (ref 35–45)
PDW BLD-RTO: 12.9 % (ref 11.5–14.5)
PERFORMED ON: ABNORMAL
PH ARTERIAL: 7.35 (ref 7.35–7.45)
PLATELET # BLD: 206 K/UL (ref 130–400)
PO2 ARTERIAL: 100 MM HG (ref 75–108)
POC FIO2: 40
POC SAMPLE TYPE: ABNORMAL
POTASSIUM SERPL-SCNC: 4.5 MEQ/L (ref 3.5–5.1)
RBC # BLD: 5.32 M/UL (ref 4.7–6.1)
SEDIMENTATION RATE, ERYTHROCYTE: 7 MM (ref 0–10)
SODIUM BLD-SCNC: 146 MEQ/L (ref 132–144)
TCO2 ARTERIAL: 29 (ref 22–29)
WBC # BLD: 19.1 K/UL (ref 4.8–10.8)

## 2017-09-30 PROCEDURE — 80048 BASIC METABOLIC PNL TOTAL CA: CPT

## 2017-09-30 PROCEDURE — 6360000002 HC RX W HCPCS: Performed by: INTERNAL MEDICINE

## 2017-09-30 PROCEDURE — 83605 ASSAY OF LACTIC ACID: CPT

## 2017-09-30 PROCEDURE — 6370000000 HC RX 637 (ALT 250 FOR IP): Performed by: HOSPITALIST

## 2017-09-30 PROCEDURE — 99223 1ST HOSP IP/OBS HIGH 75: CPT | Performed by: INTERNAL MEDICINE

## 2017-09-30 PROCEDURE — 86141 C-REACTIVE PROTEIN HS: CPT

## 2017-09-30 PROCEDURE — 6360000002 HC RX W HCPCS: Performed by: HOSPITALIST

## 2017-09-30 PROCEDURE — 6370000000 HC RX 637 (ALT 250 FOR IP): Performed by: INTERNAL MEDICINE

## 2017-09-30 PROCEDURE — 83735 ASSAY OF MAGNESIUM: CPT

## 2017-09-30 PROCEDURE — 2700000000 HC OXYGEN THERAPY PER DAY

## 2017-09-30 PROCEDURE — 99222 1ST HOSP IP/OBS MODERATE 55: CPT | Performed by: INTERNAL MEDICINE

## 2017-09-30 PROCEDURE — 2580000003 HC RX 258: Performed by: INTERNAL MEDICINE

## 2017-09-30 PROCEDURE — 82803 BLOOD GASES ANY COMBINATION: CPT

## 2017-09-30 PROCEDURE — 36415 COLL VENOUS BLD VENIPUNCTURE: CPT

## 2017-09-30 PROCEDURE — 2580000003 HC RX 258: Performed by: HOSPITALIST

## 2017-09-30 PROCEDURE — 71010 XR CHEST PORTABLE: CPT

## 2017-09-30 PROCEDURE — 94640 AIRWAY INHALATION TREATMENT: CPT

## 2017-09-30 PROCEDURE — 36600 WITHDRAWAL OF ARTERIAL BLOOD: CPT

## 2017-09-30 PROCEDURE — 2000000000 HC ICU R&B

## 2017-09-30 PROCEDURE — 94003 VENT MGMT INPAT SUBQ DAY: CPT

## 2017-09-30 PROCEDURE — 85025 COMPLETE CBC W/AUTO DIFF WBC: CPT

## 2017-09-30 PROCEDURE — 85652 RBC SED RATE AUTOMATED: CPT

## 2017-09-30 RX ADMIN — DOCUSATE SODIUM 100 MG: 100 CAPSULE, LIQUID FILLED ORAL at 20:55

## 2017-09-30 RX ADMIN — VANCOMYCIN HYDROCHLORIDE 1500 MG: 1 INJECTION, POWDER, LYOPHILIZED, FOR SOLUTION INTRAVENOUS at 00:00

## 2017-09-30 RX ADMIN — TRAMADOL HYDROCHLORIDE 50 MG: 50 TABLET, FILM COATED ORAL at 11:47

## 2017-09-30 RX ADMIN — PREDNISONE 7.5 MG: 5 TABLET ORAL at 09:11

## 2017-09-30 RX ADMIN — DIAZEPAM 2.5 MG: 5 TABLET ORAL at 20:54

## 2017-09-30 RX ADMIN — HYOSCYAMINE SULFATE 125 MCG: 0.12 TABLET ORAL at 04:32

## 2017-09-30 RX ADMIN — ASPIRIN 81 MG 81 MG: 81 TABLET ORAL at 09:11

## 2017-09-30 RX ADMIN — ENOXAPARIN SODIUM 40 MG: 40 INJECTION SUBCUTANEOUS at 09:12

## 2017-09-30 RX ADMIN — SODIUM CHLORIDE, PRESERVATIVE FREE 10 ML: 5 INJECTION INTRAVENOUS at 11:28

## 2017-09-30 RX ADMIN — Medication 4 PUFF: at 20:08

## 2017-09-30 RX ADMIN — VANCOMYCIN HYDROCHLORIDE 1500 MG: 1 INJECTION, POWDER, LYOPHILIZED, FOR SOLUTION INTRAVENOUS at 09:32

## 2017-09-30 RX ADMIN — IPRATROPIUM BROMIDE 4 PUFF: 17 AEROSOL, METERED RESPIRATORY (INHALATION) at 07:40

## 2017-09-30 RX ADMIN — TRAMADOL HYDROCHLORIDE 50 MG: 50 TABLET, FILM COATED ORAL at 20:55

## 2017-09-30 RX ADMIN — BACLOFEN 30 MG: 10 TABLET ORAL at 20:55

## 2017-09-30 RX ADMIN — Medication 4 PUFF: at 14:45

## 2017-09-30 RX ADMIN — IPRATROPIUM BROMIDE 4 PUFF: 17 AEROSOL, METERED RESPIRATORY (INHALATION) at 20:08

## 2017-09-30 RX ADMIN — SODIUM CHLORIDE, PRESERVATIVE FREE 10 ML: 5 INJECTION INTRAVENOUS at 20:56

## 2017-09-30 RX ADMIN — CETIRIZINE HYDROCHLORIDE 10 MG: 10 TABLET, FILM COATED ORAL at 09:11

## 2017-09-30 RX ADMIN — PIPERACILLIN SODIUM AND TAZOBACTAM SODIUM 3.38 G: 3; .375 INJECTION, POWDER, LYOPHILIZED, FOR SOLUTION INTRAVENOUS at 22:00

## 2017-09-30 RX ADMIN — AMANTADINE HYDROCHLORIDE 100 MG: 50 SOLUTION ORAL at 21:00

## 2017-09-30 RX ADMIN — HYOSCYAMINE SULFATE 125 MCG: 0.12 TABLET ORAL at 15:33

## 2017-09-30 RX ADMIN — Medication 4 PUFF: at 07:41

## 2017-09-30 RX ADMIN — BACLOFEN 30 MG: 10 TABLET ORAL at 09:11

## 2017-09-30 RX ADMIN — IPRATROPIUM BROMIDE 4 PUFF: 17 AEROSOL, METERED RESPIRATORY (INHALATION) at 04:06

## 2017-09-30 RX ADMIN — DIAZEPAM 2.5 MG: 5 TABLET ORAL at 04:32

## 2017-09-30 RX ADMIN — LEVETIRACETAM 1500 MG: 100 SOLUTION ORAL at 20:55

## 2017-09-30 RX ADMIN — TRAMADOL HYDROCHLORIDE 50 MG: 50 TABLET, FILM COATED ORAL at 04:32

## 2017-09-30 RX ADMIN — HYOSCYAMINE SULFATE 125 MCG: 0.12 TABLET ORAL at 20:54

## 2017-09-30 RX ADMIN — Medication 4 PUFF: at 04:06

## 2017-09-30 RX ADMIN — IPRATROPIUM BROMIDE 4 PUFF: 17 AEROSOL, METERED RESPIRATORY (INHALATION) at 14:45

## 2017-09-30 RX ADMIN — LEVETIRACETAM 1500 MG: 100 SOLUTION ORAL at 09:12

## 2017-09-30 RX ADMIN — PIPERACILLIN SODIUM AND TAZOBACTAM SODIUM 3.38 G: 3; .375 INJECTION, POWDER, LYOPHILIZED, FOR SOLUTION INTRAVENOUS at 06:07

## 2017-09-30 RX ADMIN — AMANTADINE HYDROCHLORIDE 100 MG: 50 SOLUTION ORAL at 09:13

## 2017-09-30 RX ADMIN — DIAZEPAM 2.5 MG: 5 TABLET ORAL at 11:47

## 2017-09-30 ASSESSMENT — PAIN SCALES - GENERAL
PAINLEVEL_OUTOF10: 0
PAINLEVEL_OUTOF10: 6
PAINLEVEL_OUTOF10: 0
PAINLEVEL_OUTOF10: 0

## 2017-09-30 ASSESSMENT — PULMONARY FUNCTION TESTS
PIF_VALUE: 69
PIF_VALUE: 42
PIF_VALUE: 68
PIF_VALUE: 53
PIF_VALUE: 48
PIF_VALUE: 30
PIF_VALUE: 33
PIF_VALUE: 68
PIF_VALUE: 47
PIF_VALUE: 69
PIF_VALUE: 73
PIF_VALUE: 69
PIF_VALUE: 72
PIF_VALUE: 69
PIF_VALUE: 38
PIF_VALUE: 67
PIF_VALUE: 42
PIF_VALUE: 38
PIF_VALUE: 40
PIF_VALUE: 63
PIF_VALUE: 41
PIF_VALUE: 46
PIF_VALUE: 44
PIF_VALUE: 42
PIF_VALUE: 44
PIF_VALUE: 29
PIF_VALUE: 40
PIF_VALUE: 30

## 2017-09-30 NOTE — PROGRESS NOTES
Pharmacy Note  Vancomycin Consult    Concepcion Buckley is a 19 y.o. male started on Vancomycin for possible pneumonia/airway obstruction in the setting of tracheostomy, patient admitted from SNF; consult received from Dr. Ronnie Caraballo to manage therapy. Also receiving the following antibiotics: Zosyn 3.375gm IV Q8 hours per extended infusion protocol. Patient Active Problem List   Diagnosis    Sepsis ([address]    Acute respiratory failure with hypoxia (HCC)       Allergies:  Latex; Corn-containing products; Eggs or egg-derived products; Fish-derived products; Iodinated diagnostic agents; Peanut-containing drug products; Shellfish-derived products; and Strawberry extract     Temp max: 101*F    Recent Labs      09/29/17   1413   BUN  14       Recent Labs      09/29/17   1413   CREATININE  0.69*       Recent Labs      09/29/17   1413   WBC  16.2*       No intake or output data in the 24 hours ending 09/29/17 2001    Culture Date      Source                       Results  9/29/17               blood, respiratory        pending     Ht Readings from Last 1 Encounters:   02/17/16 5' 10\" (1.778 m)        Wt Readings from Last 1 Encounters:   09/29/17 220 lb (99.8 kg)         Body mass index is 31.57 kg/(m^2). CrCl cannot be calculated (Unknown ideal weight. ). Assessment/Plan:  Will initiate vancomycin 1500 mg IV every 8 hours; CrCl estimated to be >100mL/min. Timing of trough level will be determined based on culture results, renal function, and clinical response. Will obtain trough level prior to the 4th dose (prior to afternoon dose on 9/30/17). Doses timed for 0500, 1300 and 2100. Trough level timed for 9/30/17 at 1230. Patient's goal trough level 15-20ug/mL; pharmacokinetic analysis predicts trough level of ~15.3 at this dosing strength and interval.       Thank you for the consult, Dr. Ronnie Caraballo, pharmacy will continue to follow and make necessary adjustments.     Zhen Rosado PharmADRIENNE   9/29/2017 8:10 PM

## 2017-09-30 NOTE — PROGRESS NOTES
Physical Therapy Missed Treatment   Facility/Department: [facility] MED SURG IC14/IC14-01    NAME: Loki Bates    : 1995 (19 y.o.)  MRN: [id number]    Account: [de-identified]  Gender: male    PT evaluation and treatment orders received. Chart reviewed. PT eval attempted. Pt currently on vent. Nursing reports at baseline pt non-ambulatory, unable to follow commands, dependant for all care and mobility. Please re-order PT if/when medically appropriate.          Diamond Miranda, PT, 17 at 11:16 AM

## 2017-09-30 NOTE — PROGRESS NOTES
Occupational Therapy Missed Treatment   Facility/Department: [facility] MED SURG IC14/IC14-01    NAME: Afsaneh Ortiz    : 1995 (19 y.o.)  MRN: [id number]    Account: [de-identified]  Gender: male    OT evaluation and treatment orders received. Chart reviewed. OT eval attempted. Pt currently on vent. Nursing reports pt at baseline as pt non-ambulatory, unable to follow commands, dependant for all care and mobility. Please reorder OT evaluation if and when pt is appropriate.         Electronically signed by Emily Villa OT on 17 at 12:02 PM

## 2017-09-30 NOTE — CONSULTS
Consults   Pulmonary Medicine  Consult Note      Reason for consultation: Acute on chronic respiratory failure on vent support    Consulting physician: Dr. Lisa Russ:    This is a 47-year-old male with past medical history significant for anoxic brain injury from asthmatic at age of 14 now persistent vegetative state patient has a tracheostomy and PEG tube placements. Patient was presented to 2 years from nursing home with increased shortness of breath hypoxic and found to have acute on chronic hypoxic respiratory failure. Patient is currently on a vent support with assist control rate of 18 tidal volume 400 FiO2 40% PEEP of 5. last ABG shows pH 7.346 with PCO2 of 50 PO2 100 and bicarb of 27.2 and saturation 97%. Yesterday patient was on 100% FiO2 and the PO2 was 578. Pulmonary consult regarding ventilator management. Chest x-ray shows tracheostomy in place mild right perihilar infiltration. Yesterday's chest x-ray shows no acute cardiopulmonary abnormality. He has low-grade fever with T-max of 99.2 today but otherwise stable. Past Medical History:        Diagnosis Date    Anoxic brain injury ([facility] [address]     from asthma attack at age 14    Asthma     Gastrocutaneous fistula     Persistent vegetative state ([facility] [address]     Renal calculi     Respiratory failure (HCC)     Seizures ([facility] [address]        Past Surgical History:        Procedure Laterality Date    GASTROSTOMY TUBE PLACEMENT      TRACHEOSTOMY         Social History:     reports that he does not drink alcohol or use illicit drugs. Family History:   History reviewed. No pertinent family history. Allergies:  Latex; Corn-containing products; Eggs or egg-derived products; Fish-derived products; Iodinated diagnostic agents; Peanut-containing drug products;  Shellfish-derived products; and Strawberry extract        MEDICATIONS during current hospitalization:    Continuous Infusions:   sodium chloride 75 mL/hr at 09/29/17 8399
(H) 09/30/2017    HGB 14.6 09/30/2017    HCT 46.5 09/30/2017    MCV 87.4 09/30/2017     09/30/2017     Lab Results   Component Value Date     09/30/2017    K 4.5 09/30/2017     09/30/2017    CO2 25 09/30/2017    BUN 15 09/30/2017    CREATININE 0.91 09/30/2017    GLUCOSE 90 09/30/2017    CALCIUM 8.2 09/30/2017        Chest x-ray is clear      Patient Active Problem List   Diagnosis    Sepsis ([address]    Acute respiratory failure with hypoxia (HCC)         ASSESSMENT:PLAN:    No sign of pneumonia by chest x-ray probable bronchitis and asthma exacerbation  Discontinue vancomycin continue Zosyn

## 2017-10-01 LAB
CULTURE, RESPIRATORY: NORMAL
GRAM STAIN RESULT: NORMAL
MAGNESIUM: 2.1 MG/DL (ref 1.7–2.3)

## 2017-10-01 PROCEDURE — 31502 CHANGE OF WINDPIPE AIRWAY: CPT

## 2017-10-01 PROCEDURE — 6370000000 HC RX 637 (ALT 250 FOR IP): Performed by: HOSPITALIST

## 2017-10-01 PROCEDURE — 2000000000 HC ICU R&B

## 2017-10-01 PROCEDURE — 2580000003 HC RX 258: Performed by: INTERNAL MEDICINE

## 2017-10-01 PROCEDURE — 6360000002 HC RX W HCPCS: Performed by: HOSPITALIST

## 2017-10-01 PROCEDURE — 6360000002 HC RX W HCPCS: Performed by: INTERNAL MEDICINE

## 2017-10-01 PROCEDURE — 83735 ASSAY OF MAGNESIUM: CPT

## 2017-10-01 PROCEDURE — 99232 SBSQ HOSP IP/OBS MODERATE 35: CPT | Performed by: INTERNAL MEDICINE

## 2017-10-01 PROCEDURE — 94003 VENT MGMT INPAT SUBQ DAY: CPT

## 2017-10-01 PROCEDURE — 94640 AIRWAY INHALATION TREATMENT: CPT

## 2017-10-01 PROCEDURE — 2580000003 HC RX 258: Performed by: HOSPITALIST

## 2017-10-01 PROCEDURE — 99291 CRITICAL CARE FIRST HOUR: CPT | Performed by: INTERNAL MEDICINE

## 2017-10-01 PROCEDURE — 2700000000 HC OXYGEN THERAPY PER DAY

## 2017-10-01 PROCEDURE — 36592 COLLECT BLOOD FROM PICC: CPT

## 2017-10-01 RX ADMIN — TRAMADOL HYDROCHLORIDE 50 MG: 50 TABLET, FILM COATED ORAL at 05:00

## 2017-10-01 RX ADMIN — DIAZEPAM 2.5 MG: 5 TABLET ORAL at 13:20

## 2017-10-01 RX ADMIN — DOCUSATE SODIUM 100 MG: 100 CAPSULE, LIQUID FILLED ORAL at 09:22

## 2017-10-01 RX ADMIN — TRAMADOL HYDROCHLORIDE 50 MG: 50 TABLET, FILM COATED ORAL at 22:20

## 2017-10-01 RX ADMIN — SODIUM CHLORIDE: 9 INJECTION, SOLUTION INTRAVENOUS at 12:04

## 2017-10-01 RX ADMIN — PIPERACILLIN SODIUM AND TAZOBACTAM SODIUM 3.38 G: 3; .375 INJECTION, POWDER, LYOPHILIZED, FOR SOLUTION INTRAVENOUS at 06:07

## 2017-10-01 RX ADMIN — LEVETIRACETAM 1500 MG: 100 SOLUTION ORAL at 22:26

## 2017-10-01 RX ADMIN — IPRATROPIUM BROMIDE 4 PUFF: 17 AEROSOL, METERED RESPIRATORY (INHALATION) at 04:57

## 2017-10-01 RX ADMIN — IPRATROPIUM BROMIDE 4 PUFF: 17 AEROSOL, METERED RESPIRATORY (INHALATION) at 09:05

## 2017-10-01 RX ADMIN — HYOSCYAMINE SULFATE 125 MCG: 0.12 TABLET ORAL at 13:20

## 2017-10-01 RX ADMIN — SODIUM CHLORIDE, PRESERVATIVE FREE 10 ML: 5 INJECTION INTRAVENOUS at 22:20

## 2017-10-01 RX ADMIN — PREDNISONE 7.5 MG: 5 TABLET ORAL at 09:21

## 2017-10-01 RX ADMIN — AMANTADINE HYDROCHLORIDE 100 MG: 50 SOLUTION ORAL at 22:22

## 2017-10-01 RX ADMIN — HYOSCYAMINE SULFATE 125 MCG: 0.12 TABLET ORAL at 05:00

## 2017-10-01 RX ADMIN — PIPERACILLIN SODIUM AND TAZOBACTAM SODIUM 3.38 G: 3; .375 INJECTION, POWDER, LYOPHILIZED, FOR SOLUTION INTRAVENOUS at 22:19

## 2017-10-01 RX ADMIN — PIPERACILLIN SODIUM AND TAZOBACTAM SODIUM 3.38 G: 3; .375 INJECTION, POWDER, LYOPHILIZED, FOR SOLUTION INTRAVENOUS at 13:45

## 2017-10-01 RX ADMIN — DIAZEPAM 2.5 MG: 5 TABLET ORAL at 22:19

## 2017-10-01 RX ADMIN — MONTELUKAST SODIUM 10 MG: 10 TABLET, FILM COATED ORAL at 22:19

## 2017-10-01 RX ADMIN — LEVETIRACETAM 1500 MG: 100 SOLUTION ORAL at 09:51

## 2017-10-01 RX ADMIN — ENOXAPARIN SODIUM 40 MG: 40 INJECTION SUBCUTANEOUS at 09:21

## 2017-10-01 RX ADMIN — CETIRIZINE HYDROCHLORIDE 10 MG: 10 TABLET, FILM COATED ORAL at 09:21

## 2017-10-01 RX ADMIN — AMANTADINE HYDROCHLORIDE 100 MG: 50 SOLUTION ORAL at 10:51

## 2017-10-01 RX ADMIN — TRAMADOL HYDROCHLORIDE 50 MG: 50 TABLET, FILM COATED ORAL at 13:19

## 2017-10-01 RX ADMIN — Medication 4 PUFF: at 20:32

## 2017-10-01 RX ADMIN — HYOSCYAMINE SULFATE 125 MCG: 0.12 TABLET ORAL at 22:26

## 2017-10-01 RX ADMIN — ASPIRIN 81 MG 81 MG: 81 TABLET ORAL at 09:22

## 2017-10-01 RX ADMIN — BACLOFEN 30 MG: 10 TABLET ORAL at 09:22

## 2017-10-01 RX ADMIN — DIAZEPAM 2.5 MG: 5 TABLET ORAL at 05:00

## 2017-10-01 RX ADMIN — SODIUM CHLORIDE, PRESERVATIVE FREE 10 ML: 5 INJECTION INTRAVENOUS at 09:22

## 2017-10-01 RX ADMIN — IPRATROPIUM BROMIDE 4 PUFF: 17 AEROSOL, METERED RESPIRATORY (INHALATION) at 15:53

## 2017-10-01 RX ADMIN — IPRATROPIUM BROMIDE 4 PUFF: 17 AEROSOL, METERED RESPIRATORY (INHALATION) at 20:32

## 2017-10-01 RX ADMIN — BACLOFEN 30 MG: 10 TABLET ORAL at 22:20

## 2017-10-01 RX ADMIN — Medication 4 PUFF: at 15:53

## 2017-10-01 RX ADMIN — Medication 4 PUFF: at 04:56

## 2017-10-01 RX ADMIN — Medication 4 PUFF: at 09:05

## 2017-10-01 ASSESSMENT — PAIN SCALES - GENERAL
PAINLEVEL_OUTOF10: 0
PAINLEVEL_OUTOF10: 4
PAINLEVEL_OUTOF10: 0

## 2017-10-01 ASSESSMENT — PULMONARY FUNCTION TESTS
PIF_VALUE: 39
PIF_VALUE: 18
PIF_VALUE: 32
PIF_VALUE: 39
PIF_VALUE: 30
PIF_VALUE: 28
PIF_VALUE: 24
PIF_VALUE: 37
PIF_VALUE: 33
PIF_VALUE: 41
PIF_VALUE: 35
PIF_VALUE: 35
PIF_VALUE: 49
PIF_VALUE: 12
PIF_VALUE: 46
PIF_VALUE: 38
PIF_VALUE: 42

## 2017-10-01 ASSESSMENT — PAIN DESCRIPTION - PAIN TYPE: TYPE: CHRONIC PAIN

## 2017-10-01 ASSESSMENT — PAIN DESCRIPTION - LOCATION: LOCATION: GENERALIZED

## 2017-10-01 NOTE — PROGRESS NOTES
INPATIENT PROGRESS NOTE    SERVICE DATE:  10/1/2017   SERVICE TIME:  12:21    ASSESSMENT AND PLAN   Jose Luis Victor is a 18 y/o M with a PMH sig for anoxic brain injury from asthma attack at age 14, now in a persistant vegetative state, non-verbal, s/p trach and peg, seizure d/o who presents from nursing facility for SOB, hypoxia to the 80's and purulence from trach site, found to have acute hypoxic resp failure. Acute respiratory failure with hypoxia: With O2 sats to the 80's. Placed on vent in ER. Likely acute tracheitis and acute asthma exacerbation with status asthmaticus. CXR with right perihilar infiltrate.   - Continue resp support  - Pulm consulted. Per Dr. Lennox Gable, Acute on chronic hypoxic failure on vent support with assist control mode and FiO2 40%. Possible aspiration, chest x-ray showing right perihilar infiltration. Sepsis. Patient is on vent support, vent connected via trach. He is on assist control with rate of 18 tidal volume of 500 FiO2 40%. Chest x-ray shows right perihilar infiltration and he has leukocytosis is on IV Zosyn and vancomycin continue same for now. Will repeat chest x-ray in a.m. Continue vent support and all supportive care. Patient remained full code. Los further recs. - ID consulted. Per Dr. Olu Nath, No sign of pneumonia by chest x-ray probable bronchitis and asthma exacerbation. Discontinue vancomycin continue Zosyn. Los further recs. - Continue to monitor closely  - Follow cultures  - Antibiotics per ID     Sepsis: With tachypnea, tachycardia, fever and leukocytosis. Likely pulmonary site with perihilar infiltrate pneumonia. - Rx as above  - ID consulted with recs as above  - Continue to monitor closely  - IVF     Anoxic brain injury s/p trach and peg  Asthma  Seizure d/o     Dispo: Pt is to remain a full code for now per family. Wean off vent as tolerated.  Continue to monitor closely.       SUBJECTIVE  CHIEF COMPLAINT: SOB with hypoxia    PRIMARY SERVICE:

## 2017-10-01 NOTE — CARE COORDINATION
MET W PT'S MOTHER AND EXPLAINED C3 ROLE IN PT CARE. PT TO RETURN TO SNF UPON DC. PT HAS BITTEN HIS TONGUE AND IT IS BLEEDING. ORAL AIRWAY PLACED AND THIS HAS SLOWED THIS DOWN. MOTHER GIVEN ADMISSION CONSENT TO READ AND SIGN. C3 WILL FOLLOW     CONSENTS SIGNED AND PLACED IN THE CHART.

## 2017-10-01 NOTE — PROGRESS NOTES
28  25   BUN  14  15   CREATININE  0.69*  0.91   GLUCOSE  112*  90   CALCIUM  9.7  8.2*   PROT  7.8   --    LABALBU  4.4   --    BILITOT  0.7   --    ALKPHOS  92   --    AST  26   --    ALT  73*   --    LABGLOM  >60.0  >60.0   GFRAA  >60.0  >60.0   GLOB  3.4   --        MV Settings:     Vent Mode: AC/VC  Vt Ordered: 500 mL  Rate Set: 18 bmp  FiO2 : 35 %  PEEP/CPAP: 5  Peak Inspiratory Pressure: 38 cmH2O  Mean Airway Pressure: 14 cmH20  I:E Ratio: 1:2    Recent Labs      09/30/17   0559   PHART  7.346*   PBD6KNY  50*   PO2ART  100*   [id number]  27.2   BEART  2   [id number]  97*       O2 Device: Ventilator  O2 Flow Rate (L/min): 14 L/min          MEDICATIONS during current hospitalization:    Continuous Infusions:   sodium chloride 75 mL/hr at 10/01/17 1204       Scheduled Meds:   amantadine  100 mg Per G Tube Q12H    aspirin  81 mg Per G Tube Daily    baclofen  30 mg Per G Tube BID    diazepam  2.5 mg Per G Tube Q8H    hyoscyamine  125 mcg Sublingual Q8H    levETIRAcetam  1,500 mg Per G Tube BID    cetirizine  10 mg Oral Daily    montelukast  10 mg Per G Tube Nightly    predniSONE  7.5 mg Per G Tube Daily    traMADol  50 mg Per G Tube Q8H    sodium chloride flush  10 mL Intravenous 2 times per day    docusate sodium  100 mg Oral BID    enoxaparin  40 mg Subcutaneous Daily    piperacillin-tazobactam  3.375 g Intravenous Q8H    ipratropium  4 puff Inhalation Q6H WA    albuterol sulfate HFA  4 puff Inhalation Q6H WA       PRN Meds:sodium chloride flush, acetaminophen, ondansetron    Radiology  Xr Chest Portable    Result Date: 9/30/2017  XR CHEST PORTABLE : 9/30/2017 CLINICAL HISTORY: follow up resp  failure while on vent . COMPARISON: 9/29/2017. TECHNIQUE: A portable upright AP radiograph of the chest was obtained. FINDINGS: A tracheostomy and right internal jugular approach central venous catheter remain in good apparent position. Mild right suprahilar infiltrate appears to be developing.  The left lung is clear. There is no cardiomegaly, pleural effusion, vascular congestion or pneumothorax. MILD RIGHT PERIHILAR INFILTRATE. Xr Chest Portable    Result Date: 9/29/2017  EXAMINATION: Portable AP ERECT view of the chest. CLINICAL HISTORY:  SOB r/o pneumonia . COMPARISONS: 9/29/2017 FINDINGS: Normal cardiac silhouette. The right central venous catheter extends to the SVC. The end of the endotracheal tube lies 3 cm above the marylin. Lungs are clear without consolidation. No pleural effusion or pneumothorax. The patient has a mild dextroscoliosis of the dorsal spine. NO ACUTE CARDIOPULMONARY ABNORMALITY. NO CHANGE. Xr Chest Portable    Result Date: 9/29/2017  EXAMINATION: XR CHEST PORTABLE CLINICAL HISTORY: Dyspnea  difficulty breathing  COMPARISONS: 2/17/2016 FINDINGS: Tracheostomy tube is present. Cardiac size and pulmonary vascularity are normal. The lungs are clear. There is no pneumothorax. There is dextrocurvature of the thoracic spine. NEGATIVE PORTABLE CHEST RADIOGRAPH      IMPRESSION AND SUGGESTION:    1. Acute on chronic hypoxic*failure on vent support with assist control mode and FiO2 35%  2. Possible aspiration, chest x-ray showing right perihilar infiltration  3. Sepsis  4. Anoxic brain injury status post trach and PEG  5.  Seizure disorder     Patient is on vent support, vent connected via trach. He is on assist control with rate of 18 tidal volume of 500 FiO2 35 %. Chest x-ray shows right perihilar infiltration I will repeat chest x-ray in a.m. and ABG in a.m, he has leukocytosis is on IV Zosyn and vancomycin continue same for now. Continue vent support and all supportive care. Due to tongue bite patient has oral bite block. ABG in a.m. continue all supportive care and vent support at this time.   Cc time 35 minutes           Electronically signed by Subha Olguin MD, FCCP on 10/1/2017 at 1:03 PM

## 2017-10-02 VITALS
TEMPERATURE: 99.5 F | SYSTOLIC BLOOD PRESSURE: 115 MMHG | OXYGEN SATURATION: 98 % | HEIGHT: 70 IN | BODY MASS INDEX: 30.11 KG/M2 | DIASTOLIC BLOOD PRESSURE: 69 MMHG | RESPIRATION RATE: 21 BRPM | HEART RATE: 62 BPM | WEIGHT: 210.32 LBS

## 2017-10-02 LAB
BASE EXCESS ARTERIAL: 0 (ref -3–3)
HCO3 ARTERIAL: 25.3 MMOL/L (ref 21–29)
LACTATE: 0.57 MMOL/L (ref 0.4–2)
MAGNESIUM: 2.2 MG/DL (ref 1.7–2.3)
O2 SAT, ARTERIAL: 98 % (ref 93–100)
PCO2 ARTERIAL: 45 MM HG (ref 35–45)
PERFORMED ON: ABNORMAL
PH ARTERIAL: 7.36 (ref 7.35–7.45)
PO2 ARTERIAL: 118 MM HG (ref 75–108)
POC FIO2: 35
POC SAMPLE TYPE: ABNORMAL
TCO2 ARTERIAL: 27 (ref 22–29)

## 2017-10-02 PROCEDURE — 6370000000 HC RX 637 (ALT 250 FOR IP): Performed by: HOSPITALIST

## 2017-10-02 PROCEDURE — 6370000000 HC RX 637 (ALT 250 FOR IP): Performed by: ANESTHESIOLOGY

## 2017-10-02 PROCEDURE — 83735 ASSAY OF MAGNESIUM: CPT

## 2017-10-02 PROCEDURE — 83605 ASSAY OF LACTIC ACID: CPT

## 2017-10-02 PROCEDURE — 2580000003 HC RX 258: Performed by: INTERNAL MEDICINE

## 2017-10-02 PROCEDURE — 6360000002 HC RX W HCPCS: Performed by: HOSPITALIST

## 2017-10-02 PROCEDURE — 82803 BLOOD GASES ANY COMBINATION: CPT

## 2017-10-02 PROCEDURE — 2700000000 HC OXYGEN THERAPY PER DAY

## 2017-10-02 PROCEDURE — 94640 AIRWAY INHALATION TREATMENT: CPT

## 2017-10-02 PROCEDURE — 99233 SBSQ HOSP IP/OBS HIGH 50: CPT | Performed by: INTERNAL MEDICINE

## 2017-10-02 PROCEDURE — 2580000003 HC RX 258: Performed by: HOSPITALIST

## 2017-10-02 PROCEDURE — 99232 SBSQ HOSP IP/OBS MODERATE 35: CPT | Performed by: ANESTHESIOLOGY

## 2017-10-02 PROCEDURE — 94003 VENT MGMT INPAT SUBQ DAY: CPT

## 2017-10-02 PROCEDURE — 6360000002 HC RX W HCPCS: Performed by: INTERNAL MEDICINE

## 2017-10-02 RX ORDER — PANTOPRAZOLE SODIUM 40 MG/1
40 GRANULE, DELAYED RELEASE ORAL
Status: DISCONTINUED | OUTPATIENT
Start: 2017-10-02 | End: 2017-10-02 | Stop reason: HOSPADM

## 2017-10-02 RX ADMIN — IPRATROPIUM BROMIDE 4 PUFF: 17 AEROSOL, METERED RESPIRATORY (INHALATION) at 16:40

## 2017-10-02 RX ADMIN — AMANTADINE HYDROCHLORIDE 100 MG: 50 SOLUTION ORAL at 11:10

## 2017-10-02 RX ADMIN — HYOSCYAMINE SULFATE 125 MCG: 0.12 TABLET ORAL at 13:25

## 2017-10-02 RX ADMIN — PANTOPRAZOLE SODIUM 40 MG: 40 GRANULE, DELAYED RELEASE ORAL at 13:25

## 2017-10-02 RX ADMIN — PIPERACILLIN SODIUM AND TAZOBACTAM SODIUM 3.38 G: 3; .375 INJECTION, POWDER, LYOPHILIZED, FOR SOLUTION INTRAVENOUS at 05:16

## 2017-10-02 RX ADMIN — LEVETIRACETAM 1500 MG: 100 SOLUTION ORAL at 09:46

## 2017-10-02 RX ADMIN — ASPIRIN 81 MG 81 MG: 81 TABLET ORAL at 09:45

## 2017-10-02 RX ADMIN — SODIUM CHLORIDE, PRESERVATIVE FREE 10 ML: 5 INJECTION INTRAVENOUS at 09:46

## 2017-10-02 RX ADMIN — IPRATROPIUM BROMIDE 4 PUFF: 17 AEROSOL, METERED RESPIRATORY (INHALATION) at 10:47

## 2017-10-02 RX ADMIN — ENOXAPARIN SODIUM 40 MG: 40 INJECTION SUBCUTANEOUS at 09:46

## 2017-10-02 RX ADMIN — CETIRIZINE HYDROCHLORIDE 10 MG: 10 TABLET, FILM COATED ORAL at 09:45

## 2017-10-02 RX ADMIN — BACLOFEN 30 MG: 10 TABLET ORAL at 09:44

## 2017-10-02 RX ADMIN — DIAZEPAM 2.5 MG: 5 TABLET ORAL at 13:24

## 2017-10-02 RX ADMIN — TRAMADOL HYDROCHLORIDE 50 MG: 50 TABLET, FILM COATED ORAL at 13:25

## 2017-10-02 RX ADMIN — IPRATROPIUM BROMIDE 4 PUFF: 17 AEROSOL, METERED RESPIRATORY (INHALATION) at 04:54

## 2017-10-02 RX ADMIN — PIPERACILLIN SODIUM AND TAZOBACTAM SODIUM 3.38 G: 3; .375 INJECTION, POWDER, LYOPHILIZED, FOR SOLUTION INTRAVENOUS at 13:25

## 2017-10-02 RX ADMIN — Medication 4 PUFF: at 04:54

## 2017-10-02 RX ADMIN — Medication 4 PUFF: at 10:46

## 2017-10-02 RX ADMIN — Medication 4 PUFF: at 16:39

## 2017-10-02 RX ADMIN — TRAMADOL HYDROCHLORIDE 50 MG: 50 TABLET, FILM COATED ORAL at 04:24

## 2017-10-02 RX ADMIN — HYOSCYAMINE SULFATE 125 MCG: 0.12 TABLET ORAL at 04:24

## 2017-10-02 RX ADMIN — DIAZEPAM 2.5 MG: 5 TABLET ORAL at 04:24

## 2017-10-02 RX ADMIN — SODIUM CHLORIDE: 9 INJECTION, SOLUTION INTRAVENOUS at 05:16

## 2017-10-02 RX ADMIN — SODIUM CHLORIDE, PRESERVATIVE FREE 10 ML: 5 INJECTION INTRAVENOUS at 09:24

## 2017-10-02 RX ADMIN — PREDNISONE 7.5 MG: 5 TABLET ORAL at 09:45

## 2017-10-02 ASSESSMENT — PAIN SCALES - WONG BAKER
WONGBAKER_NUMERICALRESPONSE: 2

## 2017-10-02 ASSESSMENT — PULMONARY FUNCTION TESTS
PIF_VALUE: 11
PIF_VALUE: 21
PIF_VALUE: 36
PIF_VALUE: 32
PIF_VALUE: 24
PIF_VALUE: 12
PIF_VALUE: 12
PIF_VALUE: 27
PIF_VALUE: 30
PIF_VALUE: 34
PIF_VALUE: 33
PIF_VALUE: 33
PIF_VALUE: 11
PIF_VALUE: 27
PIF_VALUE: 11
PIF_VALUE: 10
PIF_VALUE: 21
PIF_VALUE: 22
PIF_VALUE: 30
PIF_VALUE: 11
PIF_VALUE: 36
PIF_VALUE: 28
PIF_VALUE: 30
PIF_VALUE: 11

## 2017-10-02 ASSESSMENT — PAIN DESCRIPTION - PAIN TYPE
TYPE: CHRONIC PAIN
TYPE: CHRONIC PAIN

## 2017-10-02 ASSESSMENT — PAIN SCALES - GENERAL
PAINLEVEL_OUTOF10: 0
PAINLEVEL_OUTOF10: 5
PAINLEVEL_OUTOF10: 0
PAINLEVEL_OUTOF10: 4
PAINLEVEL_OUTOF10: 2
PAINLEVEL_OUTOF10: 0

## 2017-10-02 ASSESSMENT — PAIN DESCRIPTION - LOCATION
LOCATION: GENERALIZED
LOCATION: GENERALIZED

## 2017-10-02 NOTE — CARE COORDINATION
Discussed with [facility] over several phone calls. They are able to take patient and Medicaid verified, they are able to take patient with central line. Per Dr. Lorenzo Gomez, PICC not attempted due to patient's contractures. [facility] U[facility] requesting patient be set up for transport @ 6 and is contacting patient's mother. Dr. Lorenzo Gomez stating he contacted Dr. Enzo Sahu and Dr. Luc Tiwari and they are agreeable to transfer and call out to Dr. Connie Kelly - Dr. Lorenzo Gomez to speak to for transfer.

## 2017-10-02 NOTE — PROGRESS NOTES
Nutrition Assessment    Type and Reason for Visit: Initial, Positive Nutrition Screen (home TF)    Nutrition Recommendations: Continue NPO, Start Tube Feeding         Semi elemental formula ( Vital 1.2) @ 25 ml/hr to increase to a goal rate of 70 ml/hr, 50 ml water flush every 6 hours while on IVF, then resume 200 ml water , 6 x day as at [facility]    Malnutrition Assessment:  · Malnutrition Status: Insufficient data  · Context: Chronic illness  · Findings of the 6 clinical characteristics of malnutrition (Minimum of 2 out of 6 clinical characteristics is required to make the diagnosis of moderate or severe Protein Calorie Malnutrition based on AND/ASPEN Guidelines):  1. Energy Intake-Greater than 75%, not able to assess    2. Weight Loss-No significant weight loss,    3. Fat Loss-Unable to assess,    4. Muscle Loss-Unable to assess,    5. Fluid Accumulation-Mild fluid accumulation, Generalized  6.  Strength-Not measured    Nutrition Diagnosis:   · Problem: Inadequate oral intake  · Etiology: related to Impaired respiratory function-inability to consume food     Signs and symptoms:  as evidenced by NPO status due to medical condition    Nutrition Assessment:  · Subjective Assessment: Pt admitted from NH for acute respiratory failure hx of tracheostomy, now requiring vent support.    · Nutrition-Focused Physical Findings: Eyes open, contractures, mouth guard in place due to biting tongue, I/O = [id number], BM 10/1, CVC line in place, 1+ generalized edema per nursing  · Wound Type: None  · Current Nutrition Therapies:  · Oral Diet Orders: NPO   · Anthropometric Measures:  · Ht: 5' 10\" (177.8 cm)   · Current Body Wt: 210 lb (95.3 kg)  · Admission Body Wt: 220 lb (99.8 kg) (estimated)  · Usual Body Wt: 193 lb (87.5 kg) (8/17 per EMR)  · % Weight Change: increase with edema,     · Ideal Body Wt: 166 lb (75.3 kg), % Ideal Body > 100%  · BMI Classification: BMI 30.0 - 34.9 Obese Class I (31)  · Comparative Standards

## 2017-10-02 NOTE — PROGRESS NOTES
position. Mild right suprahilar infiltrate appears to be developing. The left lung is clear. There is no cardiomegaly, pleural effusion, vascular congestion or pneumothorax. MILD RIGHT PERIHILAR INFILTRATE. Xr Chest Portable    Result Date: 9/29/2017  EXAMINATION: Portable AP ERECT view of the chest. CLINICAL HISTORY:  SOB r/o pneumonia . COMPARISONS: 9/29/2017 FINDINGS: Normal cardiac silhouette. The right central venous catheter extends to the SVC. The end of the endotracheal tube lies 3 cm above the marylin. Lungs are clear without consolidation. No pleural effusion or pneumothorax. The patient has a mild dextroscoliosis of the dorsal spine. NO ACUTE CARDIOPULMONARY ABNORMALITY. NO CHANGE. Xr Chest Portable    Result Date: 9/29/2017  EXAMINATION: XR CHEST PORTABLE CLINICAL HISTORY: Dyspnea  difficulty breathing  COMPARISONS: 2/17/2016 FINDINGS: Tracheostomy tube is present. Cardiac size and pulmonary vascularity are normal. The lungs are clear. There is no pneumothorax. There is dextrocurvature of the thoracic spine. NEGATIVE PORTABLE CHEST RADIOGRAPH      IMPRESSION AND SUGGESTION:    1. Acute on chronic hypoxic failure on vent support with assist control mode and FiO2 35%  2. Possible aspiration, chest x-ray showing right perihilar infiltration  3. Sepsis  4. Anoxic brain injury status post trach and PEG  5.  Seizure disorder     Patient is on vent support, vent connected via trach. He is on assist control with rate of 18 tidal volume of 500 FiO2 35 %. Chest x-ray shows right perihilar infiltration. He is on is on IV Zosyn and vancomycin continue same for now. Continue vent support and all supportive care. Due to tongue bite patient has oral bite block.          Electronically signed by Teo Orellana MD, FCCP on 10/2/2017 at 9:48 AM

## 2017-10-02 NOTE — PLAN OF CARE
Problem: Nutrition  Goal: Optimal nutrition therapy  Outcome: Ongoing  Nutrition Problem: Inadequate oral intake  Intervention: Food and/or Nutrient Delivery: Continue NPO, Start Tube Feeding (Semielemental formula ( Vital 1.2) @ 25 ml/hr to increase to a goal rate of 70 ml/hr, 50 ml water flush every 6 hours while on IVF, then resume 200 ml water , 6 x day as at [facility])  Nutritional Goals: En to deliver > 85% estimated energy/protein needs, Reassess needs when pt no longer requires venitlator support

## 2017-10-02 NOTE — PROGRESS NOTES
full code per family. Wean off vent as tolerated. Transfer to LTAC once approved and cleared by consultants. Continue to monitor closely.       SUBJECTIVE  CHIEF COMPLAINT: SOB with hypoxia    PRIMARY SERVICE: Medicine    INTERVAL HPI: unable to obtain HPI secondary to non-verbal state.      MEDICATIONS:    Current Facility-Administered Medications   Medication Dose Route Frequency Provider Last Rate Last Dose    amantadine (SYMMETREL) solution 100 mg  100 mg Per G Tube Q12H Teresita Clarke MD   100 mg at 10/01/17 2222    aspirin chewable tablet 81 mg  81 mg Per G Tube Daily Teresita Clarke MD   81 mg at 10/01/17 3421    baclofen (LIORESAL) tablet 30 mg  30 mg Per G Tube BID Teresita Clarke MD   30 mg at 10/01/17 2220    diazepam (VALIUM) tablet 2.5 mg  2.5 mg Per G Tube Ember Abebe MD   2.5 mg at 10/02/17 0424    hyoscyamine (LEVSIN/SL) sublingual tablet 125 mcg  125 mcg Sublingual Q8H Teresita Clarke MD   125 mcg at 10/02/17 0424    levETIRAcetam (KEPPRA) 100 MG/ML solution 1,500 mg  1,500 mg Per G Tube BID Teresita Clarke MD   1,500 mg at 10/01/17 2226    cetirizine (ZYRTEC) tablet 10 mg  10 mg Oral Daily Teresita Clarke MD   10 mg at 10/01/17 0921    montelukast (SINGULAIR) tablet 10 mg  10 mg Per G Tube Nightly Teresita Clarke MD   10 mg at 10/01/17 2219    predniSONE (DELTASONE) tablet 7.5 mg  7.5 mg Per G Tube Daily Teresita Clarke MD   7.5 mg at 10/01/17 0921    traMADol (ULTRAM) tablet 50 mg  50 mg Per G Tube Q8H Teresita Clarke MD   50 mg at 10/02/17 0424    0.9 % sodium chloride infusion   Intravenous Continuous Teresita Clarke MD 75 mL/hr at 10/02/17 0516      sodium chloride flush 0.9 % injection 10 mL  10 mL Intravenous 2 times per day Teresita Clarke MD   10 mL at 10/01/17 2220    sodium chloride flush 0.9 % injection 10 mL  10 mL Intravenous PRN Teresita Clarke MD        acetaminophen (TYLENOL) tablet 650 mg  650 mg Oral Q4H PRN Teresita Clarke MD        docusate sodium (COLACE)

## 2017-10-03 NOTE — PROGRESS NOTES
2000 rn shift assessment done. Patient waiting for transport to LTAC. Remains on vent. Tube feeding off. Iv's saline locked. Has condom cath in place.

## 2017-10-04 LAB
BLOOD CULTURE, ROUTINE: NORMAL
CULTURE, BLOOD 2: NORMAL

## 2017-11-09 LAB
HCT VFR BLD CALC: 47.7 % (ref 42–52)
HEMOGLOBIN: 15.3 G/DL (ref 14–18)
MCH RBC QN AUTO: 28.2 PG (ref 27–31.3)
MCHC RBC AUTO-ENTMCNC: 32.1 % (ref 33–37)
MCV RBC AUTO: 87.7 FL (ref 80–100)
PDW BLD-RTO: 13.8 % (ref 11.5–14.5)
PLATELET # BLD: 279 K/UL (ref 130–400)
PLATELET SLIDE REVIEW: ADEQUATE
RBC # BLD: 5.45 M/UL (ref 4.7–6.1)
WBC # BLD: 6.1 K/UL (ref 4.8–10.8)

## 2017-11-09 NOTE — DISCHARGE SUMMARY
Physician Discharge Summary     Patient ID:  Tre Vidal  [id number]  62 y.o.  1995    Admit date: 9/29/2017    Discharge date and time: 10/2/2017  8:51 PM     Admitting Physician: James Casillas MD     Discharge Physician: Dr. 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      Acute respiratory failure with hypoxia: With O2 sats to the 80's. Placed on vent in ER. Likely acute tracheitis and acute asthma exacerbation with status asthmaticus. CXR with right perihilar infiltrate.   - Continue resp support  - Pulm consulted. Per Dr. Hilario Rincon, Acute on chronic hypoxic failure on vent support with assist control mode and FiO2 40%. Possible aspiration, chest x-ray showing right perihilar infiltration. Sepsis. Patient is on vent support, vent connected via trach.  He is on assist control with rate of 18 tidal volume of 500 FiO2 40%.  Chest x-ray shows right perihilar infiltration and he has leukocytosis is on IV Zosyn and vancomycin continue same for now.  Will repeat chest x-ray in a.m.  Continue vent support and all supportive care.  Patient remained full code. Los further recs. - ID consulted. Per Dr. Fide Hernandez, no significant infiltrates in the chest x-ray minimal sputum production and no fevers white count is elevated. No sign of pneumonia by chest x-ray probable bronchitis and asthma exacerbation. Discontinued vancomycin. Continues Zosyn. Los further recs.    - Continue to monitor closely  - Follow cultures  - Antibiotics per ID     Sepsis: With tachypnea, tachycardia, fever and leukocytosis. Likely pulmonary site with perihilar infiltrate pneumonia. - Rx as above  - ID consulted with recs as above  - Continue to monitor closely  - IVF     Functional quadriplegia due to anoxic brain injury  Anoxic brain injury s/p trach and peg  Asthma  Seizure d/o     Dispo: Pt is to remain a full code per family. Wean off vent as tolerated. Transferred to LTAC once approved and cleared by consultants.        Consults: pulmonary/intensive care and ID    Disposition: long term care facility    Patient Instructions:   [unfilled]  Activity: activity as tolerated  Diet: Cardiac diet2  Wound Care: none needed    Follow-up with PCP in 2 days.     Signed[place]   11/9/2017  4:51 PM